# Patient Record
Sex: FEMALE | Race: WHITE | Employment: UNEMPLOYED | URBAN - METROPOLITAN AREA
[De-identification: names, ages, dates, MRNs, and addresses within clinical notes are randomized per-mention and may not be internally consistent; named-entity substitution may affect disease eponyms.]

---

## 2019-06-28 ENCOUNTER — HOSPITAL ENCOUNTER (OUTPATIENT)
Age: 39
Discharge: HOME | End: 2019-06-28
Attending: FAMILY MEDICINE
Payer: COMMERCIAL

## 2019-06-28 VITALS
DIASTOLIC BLOOD PRESSURE: 79 MMHG | WEIGHT: 140 LBS | OXYGEN SATURATION: 97 % | RESPIRATION RATE: 18 BRPM | HEIGHT: 63 IN | HEART RATE: 56 BPM | SYSTOLIC BLOOD PRESSURE: 117 MMHG | BODY MASS INDEX: 24.8 KG/M2 | TEMPERATURE: 98 F

## 2019-06-28 DIAGNOSIS — R21 RASH: Primary | ICD-10-CM

## 2019-06-28 PROCEDURE — 36415 COLL VENOUS BLD VENIPUNCTURE: CPT

## 2019-06-28 PROCEDURE — 86618 LYME DISEASE ANTIBODY: CPT | Performed by: FAMILY MEDICINE

## 2019-06-28 PROCEDURE — 99203 OFFICE O/P NEW LOW 30 MIN: CPT

## 2019-06-28 RX ORDER — CLOTRIMAZOLE 1 %
CREAM (GRAM) TOPICAL
Qty: 14 G | Refills: 0 | Status: SHIPPED | OUTPATIENT
Start: 2019-06-28

## 2019-06-28 RX ORDER — DOXYCYCLINE HYCLATE 100 MG
100 TABLET ORAL 2 TIMES DAILY
Qty: 43 TABLET | Refills: 0 | Status: SHIPPED | OUTPATIENT
Start: 2019-06-28 | End: 2019-07-19

## 2019-06-29 NOTE — ED PROVIDER NOTES
Patient Seen in: [facility] In [address] [place] [address]    History   Patient presents with:  Rash Skin Problem (integumentary)    Stated Complaint: Poss insect bite    HPI    Patient is here with a rash over the left medial side of the knee that sh of raised erythema surrounded by approximately 0.5 cm of clearing and then an erythematous circular ring that is not raised. No excoriation. No drainage or discharge. No significant warmth. No induration.    Psychiatric: She has a normal mood and affect

## 2019-06-30 NOTE — PROGRESS NOTES
I called pt and informed her that the Lyme test is still pending. Pt notes that the rash has improved and the circular ring is gone. She still notes a small red bump. Pt says she has not filled the script for Doxycycline.    I recommended her to start apply

## 2019-07-01 LAB — B BURGDOR IGG+IGM SER QL: NEGATIVE

## 2019-11-27 ENCOUNTER — APPOINTMENT (OUTPATIENT)
Dept: OCCUPATIONAL MEDICINE | Age: 39
End: 2019-11-27
Attending: FAMILY MEDICINE

## 2024-11-06 ENCOUNTER — OFFICE VISIT (OUTPATIENT)
Dept: FAMILY MEDICINE CLINIC | Facility: CLINIC | Age: 44
End: 2024-11-06
Payer: COMMERCIAL

## 2024-11-06 VITALS
HEIGHT: 63 IN | HEART RATE: 53 BPM | DIASTOLIC BLOOD PRESSURE: 87 MMHG | SYSTOLIC BLOOD PRESSURE: 133 MMHG | WEIGHT: 135.81 LBS | BODY MASS INDEX: 24.06 KG/M2

## 2024-11-06 DIAGNOSIS — R53.83 FATIGUE, UNSPECIFIED TYPE: ICD-10-CM

## 2024-11-06 DIAGNOSIS — E55.9 VITAMIN D DEFICIENCY: ICD-10-CM

## 2024-11-06 DIAGNOSIS — Z12.9 SCREENING FOR CANCER: ICD-10-CM

## 2024-11-06 DIAGNOSIS — Z12.31 SCREENING MAMMOGRAM FOR BREAST CANCER: ICD-10-CM

## 2024-11-06 DIAGNOSIS — F41.9 ANXIETY: ICD-10-CM

## 2024-11-06 DIAGNOSIS — Z00.00 ROUTINE MEDICAL EXAM: Primary | ICD-10-CM

## 2024-11-06 PROCEDURE — 99386 PREV VISIT NEW AGE 40-64: CPT | Performed by: FAMILY MEDICINE

## 2024-11-06 NOTE — PROGRESS NOTES
HPI:   Laxmi Brown is a 43 year old female who presents for a complete physical exam.    New to me. Used to work at [employer] and had mammograms done there. Is due for her mammogram.   Menses is regular - every 3 weeks and few days. Exercising few days a week. 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    Does not know her mother's history and father's possible.   Concerned about her chronic fatigue. Does have anxiety. Seeing a therapist.   Had early colonoscopy in 30s.   Wt Readings from Last 3 Encounters:   24 135 lb 12.8 oz (61.6 kg)   19 140 lb (63.5 kg)     Body mass index is 24.06 kg/m².       No current outpatient medications on file.      History reviewed. No pertinent past medical history.   Past Surgical History:   Procedure Laterality Date    Breast surgery       breast augmentation          3-9-2017      Family History   Problem Relation Age of Onset    Diabetes Mother     Cancer Paternal Grandmother     Diabetes Paternal Grandfather     Cancer Paternal Aunt       Social History:   Social History     Socioeconomic History    Marital status:    Tobacco Use    Smoking status: Former     Types: Cigarettes    Smokeless tobacco: Never    Tobacco comments:     Social smoker 1 yr   Vaping Use    Vaping status: Never Used   Substance and Sexual Activity    Alcohol use: Yes     Comment: 2 times a month    Drug use: Never          REVIEW OF SYSTEMS:   GENERAL: feels well otherwise  Review of Systems   See HPI   EXAM:   /87   Pulse 53   Ht 5' 3\" (1.6 m)   Wt 135 lb 12.8 oz (61.6 kg)   LMP 10/18/2024   BMI 24.06 kg/m²     GENERAL: well developed, well nourished,in no apparent distress  SKIN: no rashes,no suspicious lesions  HEENT: atraumatic, normocephalic,ears and throat are clear  EYES:PERRLA, EOMI, conjunctiva are clear  LUNGS: clear to auscultation  CARDIO: RRR without murmur  GI: good BS's,no masses, HSM or tenderness  EXTREMITIES: no cyanosis, clubbing or edema  NEURO:  Oriented times three,cranial nerves are intact,motor and sensory are grossly intact    ASSESSMENT AND PLAN:   Laxmi Brown is a 43 year old female who presents for a complete physical exam.    1. Routine medical exam    - CBC With Differential With Platelet; Future  - Comp Metabolic Panel (14); Future  - Lipid Panel; Future  - TSH W Reflex To Free T4; Future  - Vitamin B12; Future  - Vitamin D; Future  - Connective Tissue Disease (ENRIQUE) Screen, Reflex Specific Antibody; Future    2. Screening mammogram for breast cancer    - [facility] REJI 2D+3D SCREENING [facility]([id number]); Future    3. Vitamin D deficiency    - Vitamin D; Future    4. Screening for cancer    - Genetic Referral - In Network    5. Fatigue, unspecified type    - Connective Tissue Disease (ENRIQUE) Screen, Reflex Specific Antibody; Future    6. Anxiety  Can be contributing to fatigue and memory issues. Will check labs for other causes.     Komal Lares MD  11/6/2024  10:42 AM

## 2024-11-08 ENCOUNTER — TELEPHONE (OUTPATIENT)
Dept: GENETICS | Facility: HOSPITAL | Age: 44
End: 2024-11-08

## 2024-11-08 NOTE — TELEPHONE ENCOUNTER
1st attempt-called pt pt vm doesn't have identifiers. lvm on pt's spouse phone requested a call back if interested in genetic counseling due to family history of cancer

## 2024-11-14 ENCOUNTER — TELEPHONE (OUTPATIENT)
Dept: FAMILY MEDICINE CLINIC | Facility: CLINIC | Age: 44
End: 2024-11-14

## 2024-11-14 NOTE — TELEPHONE ENCOUNTER
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 Lares.     They cannot see the orders and need them faxed to [phone] as soon as possible.     RN faxed the lab orders dated 11/6/24 to [facility], Attn: Britt [phone] and received confirmation that the fax was successful.

## 2024-12-03 NOTE — TELEPHONE ENCOUNTER
Final attempt- called pt asked if she's interested in genetic counseling. Pt said she is, but is driving and will call back tomorrow to schedule

## 2024-12-04 ENCOUNTER — PATIENT MESSAGE (OUTPATIENT)
Dept: FAMILY MEDICINE CLINIC | Facility: CLINIC | Age: 44
End: 2024-12-04

## 2024-12-04 DIAGNOSIS — R92.30 DENSE BREAST: Primary | ICD-10-CM

## 2024-12-27 ENCOUNTER — TELEPHONE (OUTPATIENT)
Dept: FAMILY MEDICINE CLINIC | Facility: CLINIC | Age: 44
End: 2024-12-27

## 2024-12-27 NOTE — TELEPHONE ENCOUNTER
Called to notify patient, no answer.   Left detailed message advising order faxed as requested and successful fax confirmation received.

## 2024-12-27 NOTE — TELEPHONE ENCOUNTER
Patient is requesting the ABUS order be faxed to the following site so she may schedule her appt    [facility]   FAX # [phone]    Please call patient when the order is confirmed faxed